# Patient Record
Sex: FEMALE | NOT HISPANIC OR LATINO | URBAN - METROPOLITAN AREA
[De-identification: names, ages, dates, MRNs, and addresses within clinical notes are randomized per-mention and may not be internally consistent; named-entity substitution may affect disease eponyms.]

---

## 2019-08-22 ENCOUNTER — IMPORTED ENCOUNTER (OUTPATIENT)
Dept: URBAN - METROPOLITAN AREA CLINIC 1 | Facility: CLINIC | Age: 54
End: 2019-08-22

## 2019-08-22 PROBLEM — H35.033: Noted: 2019-08-22

## 2019-08-22 PROBLEM — H04.123: Noted: 2019-08-22

## 2019-08-22 PROBLEM — H35.62: Noted: 2019-08-22

## 2019-08-22 PROBLEM — H25.813: Noted: 2019-08-22

## 2019-08-22 PROCEDURE — 92250 FUNDUS PHOTOGRAPHY W/I&R: CPT

## 2019-08-22 PROCEDURE — 92004 COMPRE OPH EXAM NEW PT 1/>: CPT

## 2019-08-22 PROCEDURE — 92015 DETERMINE REFRACTIVE STATE: CPT

## 2019-08-22 NOTE — PATIENT DISCUSSION
1.  Dry Eyes OU -- Recommend the frequent use of OTC AT's BID-QID OU Routinely (Sample of Blink Given). 2.  Cataracts OU -- Observe for now without intervention. The patient was advised to contact us if any change or worsening of vision. 3. Retinal Heme OS -- Recommend patient f/u w/ PCP for Vascular w/u to r/o possible HTN etc. Fundus Photo ordered & done today OU & showed WNL OD and Retinal Heme / Blot Heme OS. 4. GR I Hypertensive Retinopathy OU -- Recommend HTN Control. Finalized Glasses MRx was given to patient today. Return for an appointment in 6 MO for a 10 / DFE (Retinal Heme Recheck OS) with Dr. Maurilio Mathews.

## 2022-04-02 ASSESSMENT — TONOMETRY
OD_IOP_MMHG: 18
OS_IOP_MMHG: 17

## 2022-04-02 ASSESSMENT — VISUAL ACUITY
OS_SC: 20/20
OD_SC: 20/20

## 2023-01-10 NOTE — PATIENT DISCUSSION
Patient:   ROMULO SOLIS            MRN: [facility]-[id number]            FIN: [id number]              Age:   67 years     Sex:  FEMALE     :  [birth year]   Associated Diagnoses:   None   Author:   IONA [id number]     chief complaint:    vertigo, ? L sided weakness    history of present illness:     68 y/o RHWF with PMH of DM2 presenting with vertigo and a near fall.    briefly, pt 1 d ago complained of continuous vertigo, not worsened with position change. Asso with nausea, no vomiting. 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  She got meclizine from her PCP after seeing the PCP yesterday at 1500.     Today, her vertigo persisted, but able to go to work. At work, around 1000, her vertigo worsened to the point where she could not stand and with the help of her colleagues, was able to sit on the floor with her consciousness preserved.  There was no ramy LOC, seizure like activity, dysphagia, SOB, CP, headache, visual field deficits, dysequilibrium, focal motor or sensory deficits subjectively.     In the [facility] ER, noted to have left sided weakness involving face, arm and leg. Stroke alert called. Poor effort noted on examination. NIHSS 6 initially.     history of present illness:    Past Medical History and Surgeries:  Chronic right middle ear infection  Diabetes mellitus type II, non insulin dependent  Dry skin dermatitis  Gallstones    Colonoscopy:   Esophagogastroduodenoscopy:   [facility] BSO - Total abdominal hysterectomy and bilateral salpingo-oophorectomy:   Tympanoplasty:   Cholecystectomy    Family History:  No qualifying data available.    Social History:  Alcohol  Details: Use: Current.  Frequency: 1-2 times per year.  Home/Environment  Details: Alcohol Abuse in Household: No.  Substance Abuse in Household: No.  Smoker in Household: Yes.; Comment(s):  smokes outside  Substance Abuse  Details: Use: None.  Tobacco  Details: Used in Last 12  Patient understands condition, prognosis and need for follow up care. Months: No.  Use: Never smoker.    Medications (1) Active  Scheduled: (1)  aspirin  324 mg, Chewed, One Time (unscheduled)  Continuous: (0)  PRN: (0)    Allergies (1) Active Reaction  NKA None Documented      ROS: 10 point review of systems reviewed with patient and is negative except for what is outlined above in history of present illness    Physical examination:          Vitals between:   25-MAR-2019 12:21:33   TO   26-MAR-2019 12:21:33                   LAST RESULT MINIMUM MAXIMUM  Temperature 35.8 35.8 35.8  Heart Rate 67 63 79  Respiratory Rate 20 18 20  NISBP           181 166 203  NIDBP           74 70 85  NIMBP           110 102 124  SpO2                    99 99 100    general: no acute distress, resting comfortably, poor effort   Lungs: clear to auscultation bilaterally   Heart: regular rate and rhythm without murmurs/rubs/gallops  Neck: no carotid bruits   Neurological examination:  Mental status: drowsy and orientated to hospital, but not to date or time, no aphasia, follows commands correctly  cranial nerves: PERLLA, visual fields full, facial sensation and motor normal, tongue and uvula midline, hearing intact bilaterally, no SCM weakness, no nystagmus, no dysconjugate gaze, head impulse test negative  motor: 5/5 strength in upper and lower extremities bilaterally with normal tone and bulk  Sensory: no pinprick, vibration and proprioception deficits  Reflex: 2 DTR in biceps, triceps, brachioradialis, patella, achilles, flexor plantars bilaterally  Coordination: no dysmetria on finger to nose or heel to shin testing  Gait: deferred       Labs between:  25-MAR-2019 12:21 to 26-MAR-2019 12:21    CBC:                 WBC  HgB  Hct  Plt  MCV  RDW   26-MAR-2019 6.2  13.0  40.6  220  92.1  11.8     DIFF:                 Seg  Neutroph//ABS  Lymph//ABS  Mono//ABS  EOS/ABS  26-MAR-2019 NOT APPLICABLE  46 // 2.8 43 // 2.7 7 // 0.4 2 // 0.1    BMP:                 Na  Cl  BUN  Glu   26-MAR-2019 140  107  13  (H)  280                              K  CO2  Cr  Ca                              3.9  25  0.79  8.6     CMP:                 AST  ALT  AlkPhos  Bili  Albumin   26-MAR-2019 13  24  61  0.7  3.7     COAG:                 INR  PT  PTT  Ddimer  Fibrinogen    26-MAR-2019 0.9  (L) 9.6  26                        CT brain  - normal  CTA head and neck normal     Impression:    66 y/o F with 1 d of previous continuous vertigo with ability to ambulate, with acute worsening of vertigo resulting in her losing her balance and going to the floor. There is no ramy LOC and no focal neurological deficits on neurological examination currently. 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  Doubt seizure or posterior circulation stroke or metabolic derrangement.     Plan:   1) CT brain, CTa head and neck ordered stat to rule out LVO - negative  2) MRI brain w/o contrast  3) Meclizine 25 mg tid prn   4) Normalize -140   5) OK for chemical DVT prophy    total time 65 mins   spent more than 50% counseling the patient and coordinating care   d/w Dr Colon